# Patient Record
Sex: FEMALE | Race: WHITE | NOT HISPANIC OR LATINO | Employment: UNEMPLOYED | URBAN - METROPOLITAN AREA
[De-identification: names, ages, dates, MRNs, and addresses within clinical notes are randomized per-mention and may not be internally consistent; named-entity substitution may affect disease eponyms.]

---

## 2022-04-01 PROBLEM — N89.8 VAGINAL DISCHARGE: Status: ACTIVE | Noted: 2022-04-01

## 2022-07-14 PROBLEM — F41.9 ANXIETY: Status: ACTIVE | Noted: 2022-07-14

## 2023-03-09 ENCOUNTER — TELEPHONE (OUTPATIENT)
Dept: PSYCHIATRY | Facility: CLINIC | Age: 33
End: 2023-03-09
Payer: MEDICAID

## 2023-03-09 PROBLEM — R39.15 URINARY URGENCY: Status: ACTIVE | Noted: 2023-03-09

## 2023-03-09 PROBLEM — Z76.89 ENCOUNTER FOR WEIGHT MANAGEMENT: Status: ACTIVE | Noted: 2023-03-09

## 2023-03-09 NOTE — TELEPHONE ENCOUNTER
[facility] Referral Dr Nahomy Marie for Lifestyle and Wellness/PCOS.  Called and spoke with patient and scheduled her for tomorrow morning 3/10/23 at [facility] with Dr Campbell.

## 2023-04-27 PROBLEM — R31.21 ASYMPTOMATIC MICROSCOPIC HEMATURIA: Status: ACTIVE | Noted: 2023-04-27

## 2023-10-20 DIAGNOSIS — M25.519 SHOULDER PAIN, UNSPECIFIED CHRONICITY, UNSPECIFIED LATERALITY: Primary | ICD-10-CM

## 2023-10-23 ENCOUNTER — HOSPITAL ENCOUNTER (OUTPATIENT)
Dept: RADIOLOGY | Facility: HOSPITAL | Age: 33
Discharge: HOME | End: 2023-10-23
Attending: SURGERY
Payer: COMMERCIAL

## 2023-10-23 ENCOUNTER — PATIENT MESSAGE (OUTPATIENT)
Dept: ORTHOPEDICS | Facility: CLINIC | Age: 33
End: 2023-10-23

## 2023-10-23 ENCOUNTER — TELEPHONE (OUTPATIENT)
Dept: ORTHOPEDICS | Facility: CLINIC | Age: 33
End: 2023-10-23

## 2023-10-23 ENCOUNTER — OFFICE VISIT (OUTPATIENT)
Dept: ORTHOPEDICS | Facility: CLINIC | Age: 33
End: 2023-10-23
Payer: COMMERCIAL

## 2023-10-23 VITALS — BODY MASS INDEX: 36.53 KG/M2 | HEIGHT: 58 IN | WEIGHT: 174 LBS

## 2023-10-23 DIAGNOSIS — M25.511 ACUTE PAIN OF RIGHT SHOULDER: Primary | ICD-10-CM

## 2023-10-23 DIAGNOSIS — M25.519 SHOULDER PAIN, UNSPECIFIED CHRONICITY, UNSPECIFIED LATERALITY: ICD-10-CM

## 2023-10-23 PROCEDURE — 99203 OFFICE O/P NEW LOW 30 MIN: CPT | Mod: S$GLB,,, | Performed by: SURGERY

## 2023-10-23 PROCEDURE — 73030 X-RAY EXAM OF SHOULDER: CPT | Mod: 26,50,, | Performed by: RADIOLOGY

## 2023-10-23 PROCEDURE — 73030 X-RAY EXAM OF SHOULDER: CPT | Mod: TC,50,PN

## 2023-10-23 PROCEDURE — 99999 PR PBB SHADOW E&M-EST. PATIENT-LVL III: ICD-10-PCS | Mod: PBBFAC,,, | Performed by: SURGERY

## 2023-10-23 PROCEDURE — 99999 PR PBB SHADOW E&M-EST. PATIENT-LVL III: CPT | Mod: PBBFAC,,, | Performed by: SURGERY

## 2023-10-23 PROCEDURE — 99203 PR OFFICE/OUTPT VISIT, NEW, LEVL III, 30-44 MIN: ICD-10-PCS | Mod: S$GLB,,, | Performed by: SURGERY

## 2023-10-23 PROCEDURE — 73030 XR SHOULDER COMPLETE 2 OR MORE VIEWS BILATERAL: ICD-10-PCS | Mod: 26,50,, | Performed by: RADIOLOGY

## 2023-10-23 NOTE — TELEPHONE ENCOUNTER
Patient was seen in clinic today-she would like to know if she has any work restrictions. As she plans on returning tomorrow. Please advise.       Thanks!

## 2023-10-23 NOTE — PROGRESS NOTES
"Subjective:     Angel Souza     Chief Complaint   Patient presents with    Right Shoulder - Pain    Consult       HPI    Angel is a 33 y.o. female coming in today for right shoulder pain that began a few weeks ago.  She was working in a job where were caused her to have repetitive forward and a cross-body motion of the shoulder.  She noted pain at night for which she had to change positions.  She states that it did not wake her up her affect her sleep but she had find a new position of sleep.  She does note a little bit of mechanical locking/clicking, however this is not causing her any major discomfort.  She is no longer working in his job so she feels her pain may get better on its own.  She presents here for initial consultation.  She denies any joint instability, numbness or tingling.  She is right-handed.    ROS  Nothing of note on 12 point review  PAST MEDICAL HISTORY:   Past Medical History:   Diagnosis Date    IBS (irritable bowel syndrome)      PAST SURGICAL HISTORY:   Past Surgical History:   Procedure Laterality Date    CHOLECYSTECTOMY      COLPOSCOPY       FAMILY HISTORY: History reviewed. No pertinent family history.  SOCIAL HISTORY:   Social History     Socioeconomic History    Marital status: Single   Tobacco Use    Smoking status: Former     Types: Vaping with nicotine    Smokeless tobacco: Never   Substance and Sexual Activity    Alcohol use: Yes     Comment: occ    Drug use: Never    Sexual activity: Not Currently       MEDICATIONS:   Current Outpatient Medications:     metroNIDAZOLE (METROGEL) 0.75 % (37.5mg/5 gram) vaginal gel, Insert 1 applicatorful (5 g total) twice weekly for 16 weeks. (Patient not taking: Reported on 10/23/2023), Disp: 40 g, Rfl: 3  ALLERGIES:   Review of patient's allergies indicates:   Allergen Reactions    Codeine     Doxycycline      Other Reaction(s): Gi upset    Bentyl [dicyclomine] Rash       Objective:     VITAL SIGNS: Ht 4' 10" (1.473 m)   Wt 78.9 kg (174 lb)   " BMI 36.37 kg/m²    Ortho/SPM Exam    MUSCULOSKELETAL EXAM  Shoulder: left SHOULDER  The affected shoulder is compared to the contralateral shoulder.  Positive findings otherwise noted at the bottom of the exam.    Observation:      SHOULDER  No ecchymosis, edema, or erythema throughout the shoulder girdle.  No sternal, clavicular, or acromial deformities bilaterally.  No atrophy of the pectorals, deltoids, supraspinatus, infraspinatus, or biceps bilaterally.  No asymmetry of shoulders bilaterally.    ROM (* = with pain):  CERVICAL SPINE  Full AROM in flexion, extension, sidebending, and rotation.    SHOULDER  Active flexion to 180° on left and 180° on right.  Active abduction to 180° on left and 180° on right.  Active internal rotation to T7 on left and T7 on right.    Active external rotation to T4 on left and T4 on right.    No scapular dyskinesia or winging.    Tenderness:  No tenderness at the SC or AC joint  No tenderness over the clavicle   No tenderness over biceps tendon or bicipital groove  No tenderness over subacromial space    Strength Testing (* = with pain):  Deltoid - 5/5 on left and 5/5 on right  Biceps - 5/5 on left and 5/5 on right  Triceps - 5/5 on left and 5/5 on right  Wrist extension - 5/5 on left and 5/5 on right  Wrist flexion - 5/5 on left and 5/5 on right   - 5/5 on left and 5/5 on right  Finger extension - 5/5 on left and 5/5 on right  Finger abduction - 5/5 on left and 5/5 on right    Special Tests:  Empty can test - negative  Full can test - negative  Bear hug test - negative  Belly press test - negative  Resisted internal rotation - negative  Resisted external rotation - negative    Neer's test - negative  Hawkin's-Paul test - negative  Cross-arm test- negative    OSamirs test - negative  Biceps load 1 test - negative  Biceps load 2 test - negative  Damon sheer test - negative    Sulcus sign - none  AP load and shift laxity - none  Anterior apprehension test -  negative  Posterior apprehension test - negative    Speed's test - negative  Yergason's test - negative    Neurovascular Exam:  Spurlings test - negative bialterally  Lhermittes test - negative  2+ radial pulses bilaterally  Sensation intact to light touch in the distal median, radial, and ulnar nerve distributions bilaterally.  Negative Tinel's at carpal tunnel  Negative Tinel's at cubital tunnel  2+/4 reflexes at triceps, biceps, and brachioradialis  Capillary refill intact <2 seconds in all digits bilaterally    Full range of motion of the cervical/thoracic spine in all planes without pain    Positive findings:  Positive Howard's empty can test, positive cross-body adduction, positive tender to palpation about the biceps    IMAGING:    X- rays of the shoulder (AP, Scapular Y, Bernageau, and axillary views) taken today.  X-ray images were reviewed personally by me and then directly with patient.  No obvious osseous abnormality    Assessment:      No diagnosis found.       Shoulder pain, right    Plan:   We discussed impingement, biceps pathology, as well as labral pathology.  At this point in time she is not interested in an MRI or physical therapy, but would prefer to follow up using a watchful waiting approach.  I told her she could follow up in 6 weeks and if her pain is not improved in that time we will discuss additional options including advanced imaging.  Patient agreeable to today's plan and all questions were answered    This note is dictated using the M*Modal Fluency Direct word recognition program. There are word recognition mistakes that are occasionally missed on review.

## 2023-10-24 RX ORDER — MELOXICAM 15 MG/1
15 TABLET ORAL DAILY
Qty: 30 TABLET | Refills: 0 | Status: SHIPPED | OUTPATIENT
Start: 2023-10-24

## 2023-10-24 RX ORDER — MELOXICAM 15 MG/1
15 TABLET ORAL DAILY
Qty: 30 TABLET | Refills: 0 | Status: SHIPPED | OUTPATIENT
Start: 2023-10-24 | End: 2023-10-24

## 2023-10-25 ENCOUNTER — TELEPHONE (OUTPATIENT)
Dept: ORTHOPEDICS | Facility: CLINIC | Age: 33
End: 2023-10-25
Payer: COMMERCIAL

## 2023-10-25 DIAGNOSIS — M25.511 ACUTE PAIN OF RIGHT SHOULDER: Primary | ICD-10-CM

## 2023-10-25 NOTE — TELEPHONE ENCOUNTER
"----- Message from Essie Soto sent at 10/25/2023 10:20 AM CDT -----  "Type:  Patient Call Back    Who Called:PT    What is the reqeust in detail:Pt need code for MRI  so billing can provide her a price. Please advise    Can the clinic reply by [facility]?no     Best Call Back Number:[phone]      Additional Information:Spoke with billing and need code so they can give patient the rt payment amount.            "

## 2024-04-15 PROBLEM — Z01.419 WELL WOMAN EXAM WITH ROUTINE GYNECOLOGICAL EXAM: Status: ACTIVE | Noted: 2024-04-15

## 2024-04-15 PROBLEM — R63.5 WEIGHT GAIN: Status: ACTIVE | Noted: 2024-04-15

## 2025-04-30 ENCOUNTER — PATIENT MESSAGE (OUTPATIENT)
Dept: OBSTETRICS AND GYNECOLOGY | Facility: CLINIC | Age: 35
End: 2025-04-30

## 2025-04-30 ENCOUNTER — OFFICE VISIT (OUTPATIENT)
Dept: OBSTETRICS AND GYNECOLOGY | Facility: CLINIC | Age: 35
End: 2025-04-30
Payer: MEDICAID

## 2025-04-30 VITALS
SYSTOLIC BLOOD PRESSURE: 101 MMHG | DIASTOLIC BLOOD PRESSURE: 69 MMHG | HEART RATE: 73 BPM | WEIGHT: 188 LBS | HEIGHT: 58 IN | BODY MASS INDEX: 39.47 KG/M2

## 2025-04-30 DIAGNOSIS — Z01.419 WELL WOMAN EXAM WITH ROUTINE GYNECOLOGICAL EXAM: Primary | ICD-10-CM

## 2025-04-30 PROCEDURE — 3078F DIAST BP <80 MM HG: CPT | Mod: CPTII,,, | Performed by: STUDENT IN AN ORGANIZED HEALTH CARE EDUCATION/TRAINING PROGRAM

## 2025-04-30 PROCEDURE — 3074F SYST BP LT 130 MM HG: CPT | Mod: CPTII,,, | Performed by: STUDENT IN AN ORGANIZED HEALTH CARE EDUCATION/TRAINING PROGRAM

## 2025-04-30 PROCEDURE — 1159F MED LIST DOCD IN RCRD: CPT | Mod: CPTII,,, | Performed by: STUDENT IN AN ORGANIZED HEALTH CARE EDUCATION/TRAINING PROGRAM

## 2025-04-30 PROCEDURE — 3008F BODY MASS INDEX DOCD: CPT | Mod: CPTII,,, | Performed by: STUDENT IN AN ORGANIZED HEALTH CARE EDUCATION/TRAINING PROGRAM

## 2025-04-30 PROCEDURE — 99385 PREV VISIT NEW AGE 18-39: CPT | Mod: S$PBB,1E,GY, | Performed by: STUDENT IN AN ORGANIZED HEALTH CARE EDUCATION/TRAINING PROGRAM

## 2025-04-30 PROCEDURE — 99999 PR PBB SHADOW E&M-EST. PATIENT-LVL III: CPT | Mod: PBBFAC,,, | Performed by: STUDENT IN AN ORGANIZED HEALTH CARE EDUCATION/TRAINING PROGRAM

## 2025-04-30 PROCEDURE — 99213 OFFICE O/P EST LOW 20 MIN: CPT | Mod: PBBFAC,PO | Performed by: STUDENT IN AN ORGANIZED HEALTH CARE EDUCATION/TRAINING PROGRAM

## 2025-04-30 NOTE — PROGRESS NOTES
CC: Well woman exam    HPI:  Angel Souza is a 34 y.o. female  presents for a well woman exam.  She reports upper back pain from large breasts, inability to lost weight, recurrent BV, overactive bladder.     No current BV symptoms but often gets infections (every few months)  Has gained a lot of weight since moving back to the area/stress/depression symptoms have increased. Feel like she is managing well but not seeing any large change in weight (reports maybe 4lbs weight loss)   OAB - feels like she constantly has to pee even after emptying has to go again. Does not drink a lot of caffeine or other bladder irritants      Patient history:   Past Medical History:   Diagnosis Date    IBS (irritable bowel syndrome)      Past Surgical History:   Procedure Laterality Date    CHOLECYSTECTOMY      COLPOSCOPY       OB History    Para Term  AB Living   0 0 0 0 0 0   SAB IAB Ectopic Multiple Live Births   0 0 0 0 0       GYN  Menopausal: No  History of abnormal paps: DENIES  Abnormal or postmenopausal bleeding: DENIES  History of abnormal mammograms:N/A   Family history of breast or ovarian cancer: DENIES  Any breast masses, pain, skin changes, or nipple discharge: DENIES  Possible recent STD exposure: denies  Contraception: None    Pap: Done today  Mammogram: N/A      No family history on file.  Social History[1]  Allergies: Codeine, Doxycycline, and Bentyl [dicyclomine]  Current Medications[2]       ROS:  GENERAL: Denies weight gain or weight loss. Feeling well overall.   SKIN: Denies rash or lesions.   HEAD: Denies head injury or headache.   NODES: Denies enlarged lymph nodes.   CHEST: Denies chest pain or shortness of breath.   CARDIOVASCULAR: Denies palpitations or left sided chest pain.   ABDOMEN: No abdominal pain, constipation, diarrhea, nausea, vomiting or rectal bleeding.   URINARY: No frequency, dysuria, hematuria, or burning on urination.  REPRODUCTIVE: See HPI.   BREASTS: The patient  "performs breast self-examination and denies pain, lumps, or nipple discharge.   HEMATOLOGIC: No easy bruisability or excessive bleeding.  MUSCULOSKELETAL: Denies joint pain or swelling.   NEUROLOGIC: Denies syncope or weakness.   PSYCHIATRIC: Denies depression, anxiety or mood swings.    Objective:   /69 (BP Location: Left arm, Patient Position: Sitting)   Pulse 73   Ht 4' 10" (1.473 m)   Wt 85.3 kg (188 lb)   LMP 04/20/2025 (Exact Date)   BMI 39.29 kg/m²       Physical Exam:  APPEARANCE: Well nourished, well developed, in no acute distress.  AFFECT: WNL, alert and oriented x 3  SKIN: No acne or hirsutism  NECK: Neck symmetric without masses or thyromegaly  CHEST: Good respiratory effect  ABDOMEN: Soft.  No tenderness or masses.  No hepatosplenomegaly.  No hernias.  BREASTS: Symmetrical, no skin changes or visible lesions.  No palpable masses, nipple discharge bilaterally.  PELVIC: Normal external genitalia without lesions.  Normal hair distribution.  Adequate perineal body, normal urethral meatus.  Vagina moist and well rugated without lesions or discharge.  Cervix pink, without lesions, discharge or tenderness.  No significant cystocele or rectocele.     EXTREMITIES: No edema.    ASSESSMENT AND PLAN  1. Well woman exam with routine gynecological exam  Liquid-Based Pap Smear, Screening    HPV High Risk Genotypes, PCR          Annual exam  Breast and pelvic exam: WNL  Patient counseled on ASCCP guidelines for cervical cytology screening  Cervical screening: done today   Patient counseled on current recommendations for breast cancer screening  Mammogram screening: at 40  STD testing: not requested today   Osteoporosis screening at 65    2. Weight loss counseling   - discussed benefits of weight loss (goal at least 5-7% of total body weight) to her overall health and risk of developing CV disease/other co morbidities   - reviewed medication options available to help with weight loss and associated " risks/benefits, side effects: Damien/orlistat, Qsymia (Phentermine/Topiramate), Contrave (naltrexone/buproprion), Adipex (phentermine), semaglutide (Wegovy)   - reviewed option to have surgical evaluation for weight loss surgery due to BMI (>40 or >35 with at least one co-morbidity)   - Memolane message sent with available options so patient may consider     3. Overactive bladder   - discussed formal testing/evaluation with Urogyn if desires  - counseled patient on avoiding bladder irritants, bladder training   - discussed possible tx options including anti muscarinics, botox     She was counseled to follow up with her PCP for other routine health maintenance      Follow up in about 1 year (around 4/30/2026).      Jess Steele MD  OBGYN [facility]            [1]   Social History  Tobacco Use    Smoking status: Former     Types: Vaping with nicotine    Smokeless tobacco: Never   Substance Use Topics    Alcohol use: Yes     Comment: occ    Drug use: Never   [2] No current outpatient medications on file.

## 2025-05-08 ENCOUNTER — RESULTS FOLLOW-UP (OUTPATIENT)
Dept: OBSTETRICS AND GYNECOLOGY | Facility: CLINIC | Age: 35
End: 2025-05-08
Payer: MEDICAID

## 2025-05-08 DIAGNOSIS — Z76.89 ENCOUNTER FOR WEIGHT MANAGEMENT: ICD-10-CM

## 2025-07-02 ENCOUNTER — PATIENT MESSAGE (OUTPATIENT)
Dept: OBSTETRICS AND GYNECOLOGY | Facility: CLINIC | Age: 35
End: 2025-07-02

## 2025-07-02 ENCOUNTER — OFFICE VISIT (OUTPATIENT)
Dept: OBSTETRICS AND GYNECOLOGY | Facility: CLINIC | Age: 35
End: 2025-07-02
Payer: MEDICAID

## 2025-07-02 VITALS
BODY MASS INDEX: 39.84 KG/M2 | SYSTOLIC BLOOD PRESSURE: 122 MMHG | WEIGHT: 189.81 LBS | DIASTOLIC BLOOD PRESSURE: 83 MMHG | HEIGHT: 58 IN

## 2025-07-02 DIAGNOSIS — E28.2 PCOS (POLYCYSTIC OVARIAN SYNDROME): Primary | ICD-10-CM

## 2025-07-02 PROCEDURE — 1159F MED LIST DOCD IN RCRD: CPT | Mod: CPTII,,, | Performed by: STUDENT IN AN ORGANIZED HEALTH CARE EDUCATION/TRAINING PROGRAM

## 2025-07-02 PROCEDURE — 99999 PR PBB SHADOW E&M-EST. PATIENT-LVL III: CPT | Mod: PBBFAC,,, | Performed by: STUDENT IN AN ORGANIZED HEALTH CARE EDUCATION/TRAINING PROGRAM

## 2025-07-02 PROCEDURE — 99213 OFFICE O/P EST LOW 20 MIN: CPT | Mod: PBBFAC,PO | Performed by: STUDENT IN AN ORGANIZED HEALTH CARE EDUCATION/TRAINING PROGRAM

## 2025-07-02 PROCEDURE — 3008F BODY MASS INDEX DOCD: CPT | Mod: CPTII,,, | Performed by: STUDENT IN AN ORGANIZED HEALTH CARE EDUCATION/TRAINING PROGRAM

## 2025-07-02 PROCEDURE — 99214 OFFICE O/P EST MOD 30 MIN: CPT | Mod: S$PBB,,, | Performed by: STUDENT IN AN ORGANIZED HEALTH CARE EDUCATION/TRAINING PROGRAM

## 2025-07-02 PROCEDURE — 3079F DIAST BP 80-89 MM HG: CPT | Mod: CPTII,,, | Performed by: STUDENT IN AN ORGANIZED HEALTH CARE EDUCATION/TRAINING PROGRAM

## 2025-07-02 PROCEDURE — 3074F SYST BP LT 130 MM HG: CPT | Mod: CPTII,,, | Performed by: STUDENT IN AN ORGANIZED HEALTH CARE EDUCATION/TRAINING PROGRAM

## 2025-07-02 NOTE — PROGRESS NOTES
"CC: discuss PCOS evaluation     HPI:  Angel Souza is a 34 y.o. female  presents with complaint of weight gain. Would like to evaluate for PCOS as she is still having trouble with weight gain, acne/skin changes, mood swings, etc. Reports urinary frequency, also worried about diabetes risk.    ROS:  GENERAL: No fever, chills, fatigability or weight loss.  VULVAR: No pain, no lesions and no itching.  VAGINAL: No relaxation, no itching, no discharge, no abnormal bleeding and no lesions.  ABDOMEN: No abdominal pain. Denies nausea. Denies vomiting. No diarrhea. No constipation  BREAST: Denies pain. No lumps. No discharge.  URINARY: No incontinence, no nocturia, and no dysuria.  CARDIOVASCULAR: No chest pain. No shortness of breath. No leg cramps.  NEUROLOGICAL: No headaches. No vision changes.      Patient History:  Past Medical History:   Diagnosis Date    IBS (irritable bowel syndrome)      Past Surgical History:   Procedure Laterality Date    CHOLECYSTECTOMY      COLPOSCOPY       Social History[1]  No family history on file.  OB History    Para Term  AB Living   0 0 0 0 0 0   SAB IAB Ectopic Multiple Live Births   0 0 0 0 0       Objective:   /83   Ht 4' 10" (1.473 m)   Wt 86.1 kg (189 lb 13.1 oz)   LMP 06/10/2025 (Approximate)   BMI 39.67 kg/m²   Patient's last menstrual period was 06/10/2025 (approximate).      PHYSICAL EXAM:  APPEARANCE: Well nourished, well developed, in no acute distress.  AFFECT: WNL, alert and oriented x 3  SKIN: No acne or hirsutism  NECK: Neck symmetric without masses or thyromegaly  CHEST: Good respiratory effect  EXTREMITIES: No edema.      ASSESSMENT and PLAN:    ICD-10-CM ICD-9-CM    1. PCOS (polycystic ovarian syndrome)  E28.2 256.4 US Pelvis Comp with Transvag NON-OB (xpd      Follicle Stimulating Hormone      Prolactin      TSH      Estradiol      TESTOSTERONE PANEL      Comprehensive Metabolic Panel        PCOS evaluation   - discusse evaluation of " PCOS with labs/US, wants to hold off on A1c for now   - clinical evidence of hyperandrogenism: +acne   - labs ordered: FSH, estradiol, testosterone, prolactin, TSH, CMP  - TVUS ordered  - discussed use of OCPs to regulate menses, metformin, spironolactone, weight loss medications reviewed again (she never found out if any were covered after our last discussion)       Follow up: as needed if symptoms worsen or abnormal labs/imaging       Jess Steele MD  [facility]            [1]   Social History  Tobacco Use    Smoking status: Former     Types: Vaping with nicotine    Smokeless tobacco: Never   Substance Use Topics    Alcohol use: Yes     Comment: occ    Drug use: Never

## 2025-07-03 ENCOUNTER — HOSPITAL ENCOUNTER (OUTPATIENT)
Dept: RADIOLOGY | Facility: HOSPITAL | Age: 35
Discharge: HOME | End: 2025-07-03
Attending: STUDENT IN AN ORGANIZED HEALTH CARE EDUCATION/TRAINING PROGRAM
Payer: MEDICAID

## 2025-07-03 DIAGNOSIS — E28.2 PCOS (POLYCYSTIC OVARIAN SYNDROME): ICD-10-CM

## 2025-07-03 PROCEDURE — 76830 TRANSVAGINAL US NON-OB: CPT | Mod: 26,,, | Performed by: RADIOLOGY

## 2025-07-03 PROCEDURE — 76856 US EXAM PELVIC COMPLETE: CPT | Mod: TC

## 2025-07-03 PROCEDURE — 76856 US EXAM PELVIC COMPLETE: CPT | Mod: 26,,, | Performed by: RADIOLOGY

## 2025-07-07 ENCOUNTER — PATIENT MESSAGE (OUTPATIENT)
Dept: OBSTETRICS AND GYNECOLOGY | Facility: CLINIC | Age: 35
End: 2025-07-07
Payer: MEDICAID

## 2025-07-07 DIAGNOSIS — E28.2 PCOS (POLYCYSTIC OVARIAN SYNDROME): Primary | ICD-10-CM

## 2025-07-08 ENCOUNTER — TELEPHONE (OUTPATIENT)
Dept: OBSTETRICS AND GYNECOLOGY | Facility: CLINIC | Age: 35
End: 2025-07-08
Payer: MEDICAID

## 2025-07-08 DIAGNOSIS — E28.2 PCOS (POLYCYSTIC OVARIAN SYNDROME): Primary | ICD-10-CM

## 2025-07-08 NOTE — TELEPHONE ENCOUNTER
Called patient to discuss labs and US results - lab work normal, waiting for testosterone level but other labs normal.     US showed septum and multiple follicles on ovaries. With symptoms and US images, suspect PCOS. Discussed again options for PCOS management, she will consider. Discussed with septum may have irregular bleeding and risk of miscarriage/difficult getting pregnant. If she does want to try for pregnancy in future, would recommend HSC to remove septum and may need OI to help ovulation due to suspected PCOS. Will discuss with her boyfriend what she wants to do next and let me know. All questions answered.

## 2025-07-17 ENCOUNTER — LAB VISIT (OUTPATIENT)
Dept: LAB | Facility: HOSPITAL | Age: 35
End: 2025-07-17
Attending: STUDENT IN AN ORGANIZED HEALTH CARE EDUCATION/TRAINING PROGRAM
Payer: MEDICAID

## 2025-07-17 DIAGNOSIS — E28.2 PCOS (POLYCYSTIC OVARIAN SYNDROME): ICD-10-CM

## 2025-07-17 LAB
EAG (OHS): 103 MG/DL (ref 68–131)
GLUCOSE P FAST SERPL-MCNC: 91 MG/DL (ref 70–110)
GLUCOSE SERPL-MCNC: 85 MG/DL (ref 50–450)
GLUCOSE SERPL-MCNC: 95 MG/DL (ref 50–450)
HBA1C MFR BLD: 5.2 % (ref 4–5.6)

## 2025-07-17 PROCEDURE — 82947 ASSAY GLUCOSE BLOOD QUANT: CPT

## 2025-07-17 PROCEDURE — 82951 GLUCOSE TOLERANCE TEST (GTT): CPT

## 2025-07-17 PROCEDURE — 83036 HEMOGLOBIN GLYCOSYLATED A1C: CPT

## 2025-07-17 PROCEDURE — 36415 COLL VENOUS BLD VENIPUNCTURE: CPT

## 2025-07-17 PROCEDURE — 82950 GLUCOSE TEST: CPT

## 2025-07-24 ENCOUNTER — LAB VISIT (OUTPATIENT)
Dept: LAB | Facility: HOSPITAL | Age: 35
End: 2025-07-24
Attending: PSYCHIATRY & NEUROLOGY
Payer: MEDICAID

## 2025-07-24 DIAGNOSIS — E28.2 PCOS (POLYCYSTIC OVARIAN SYNDROME): ICD-10-CM

## 2025-07-24 LAB
DHEA-S SERPL-MCNC: 89.9 UG/DL (ref 95.8–511.7)
TESTOST SERPL-MCNC: 35 NG/DL (ref 5–73)

## 2025-07-24 PROCEDURE — 84403 ASSAY OF TOTAL TESTOSTERONE: CPT

## 2025-07-24 PROCEDURE — 36415 COLL VENOUS BLD VENIPUNCTURE: CPT

## 2025-07-24 PROCEDURE — 82627 DEHYDROEPIANDROSTERONE: CPT

## 2025-07-30 ENCOUNTER — PATIENT MESSAGE (OUTPATIENT)
Dept: OBSTETRICS AND GYNECOLOGY | Facility: CLINIC | Age: 35
End: 2025-07-30
Payer: MEDICAID

## 2025-07-30 DIAGNOSIS — R30.0 DYSURIA: Primary | ICD-10-CM

## 2025-08-01 ENCOUNTER — LAB VISIT (OUTPATIENT)
Dept: LAB | Facility: HOSPITAL | Age: 35
End: 2025-08-01
Attending: STUDENT IN AN ORGANIZED HEALTH CARE EDUCATION/TRAINING PROGRAM
Payer: MEDICAID

## 2025-08-01 DIAGNOSIS — R30.0 DYSURIA: ICD-10-CM

## 2025-08-01 LAB
BILIRUB UR QL STRIP.AUTO: NEGATIVE
CLARITY UR: CLEAR
COLOR UR AUTO: YELLOW
GLUCOSE UR QL STRIP: NEGATIVE
HGB UR QL STRIP: NEGATIVE
KETONES UR QL STRIP: NEGATIVE
LEUKOCYTE ESTERASE UR QL STRIP: NEGATIVE
NITRITE UR QL STRIP: NEGATIVE
PH UR STRIP: 7 [PH]
PROT UR QL STRIP: NEGATIVE
SP GR UR STRIP: 1.02
UROBILINOGEN UR STRIP-ACNC: NEGATIVE EU/DL

## 2025-08-01 PROCEDURE — 81003 URINALYSIS AUTO W/O SCOPE: CPT

## 2025-08-01 PROCEDURE — 87086 URINE CULTURE/COLONY COUNT: CPT

## 2025-08-02 LAB — BACTERIA UR CULT: NORMAL
